# Patient Record
Sex: MALE | Race: WHITE
[De-identification: names, ages, dates, MRNs, and addresses within clinical notes are randomized per-mention and may not be internally consistent; named-entity substitution may affect disease eponyms.]

---

## 2018-08-19 ENCOUNTER — HOSPITAL ENCOUNTER (EMERGENCY)
Dept: HOSPITAL 62 - ER | Age: 23
Discharge: HOME | End: 2018-08-19
Payer: COMMERCIAL

## 2018-08-19 VITALS — DIASTOLIC BLOOD PRESSURE: 71 MMHG | SYSTOLIC BLOOD PRESSURE: 118 MMHG
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
## 2018-08-19 DIAGNOSIS — F17.200: ICD-10-CM
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
## 2018-08-19 DIAGNOSIS — S61.211A: Primary | ICD-10-CM

## 2018-08-19 PROCEDURE — 12001 RPR S/N/AX/GEN/TRNK 2.5CM/<: CPT

## 2018-08-19 PROCEDURE — 73140 X-RAY EXAM OF FINGER(S): CPT

## 2018-08-19 PROCEDURE — 99283 EMERGENCY DEPT VISIT LOW MDM: CPT

## 2018-08-19 PROCEDURE — 0HQGXZZ REPAIR LEFT HAND SKIN, EXTERNAL APPROACH: ICD-10-PCS | Performed by: EMERGENCY MEDICINE

## 2018-08-19 NOTE — ER DOCUMENT REPORT
ED Medical Screen (RME)





- General


Chief Complaint: Laceration


Stated Complaint: CUT FINGER


Time Seen by Provider: 08/19/18 19:42


Mode of Arrival: Ambulatory


Information source: Patient


Notes: 





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 

sushi.  Patient states that the bone was exposed.  His last tetanus shot he 

reports 2 years ago.  Currently hemostasis is maintained.  Patient is somewhat 

anxious and complaining of pain in triage.  Pain is addressed.  X-rays ordered.


TRAVEL OUTSIDE OF THE [place] IN LAST 30 DAYS: No





- Related Data


Allergies/Adverse Reactions: 


 





No Known Allergies Allergy (Verified 12/13/16 17:52)


 











Past Medical History





- Social History


Chew tobacco use (# tins/day): No


Frequency of alcohol use: None


Drug Abuse: Marijuana





- Past Medical History


Cardiac Medical History: 


   Denies: Hx Coronary Artery Disease, Hx Heart Attack, Hx Hypertension


Pulmonary Medical History: 


   Denies: Hx Asthma, Hx Bronchitis, Hx COPD, Hx Pneumonia


Neurological Medical History: Denies: Hx Cerebrovascular Accident, Hx Seizures


Renal/ Medical History: Denies: Hx Peritoneal Dialysis


Musculoskeltal Medical History: Denies Hx Arthritis


Past Surgical History: Denies: Hx Pacemaker





- Immunizations


Hx Diphtheria, Pertussis, Tetanus Vaccination: Yes





Physical Exam





- Vital signs


Vitals: 





 











Temp Pulse Resp BP Pulse Ox


 


 98.3 F   70   16   112/58 L  100 


 


 08/19/18 19:34  08/19/18 19:34  08/19/18 19:34  08/19/18 19:34  08/19/18 19:34














Course





- Vital Signs


Vital signs: 





 











Temp Pulse Resp BP Pulse Ox


 


 98.3 F   70   16   112/58 L  100 


 


 08/19/18 19:34  08/19/18 19:34  08/19/18 19:34  08/19/18 19:34  08/19/18 19:34

## 2018-08-19 NOTE — ER DOCUMENT REPORT
ED General





- General


Chief Complaint: Laceration


Stated Complaint: CUT FINGER


Time Seen by Provider: 08/19/18 19:42


Mode of Arrival: Ambulatory


Notes: 





Patient is a 23-year-old male who presents with a skin avulsion type laceration 

to the volar tip of the left index finger sustained with a knife while at work.

  Patient states that he is cutting through a plastic packing when he cut 

directly into his finger.  He describes an immediate, severe, burning pain to 

the affected area.  Nothing improves the pain.  Any movement or touching the 

area worsens the pain.  No history of similar injuries in the past.  He is right

-hand dominant.  He did not sustain any additional injuries today.  His tetanus 

is up-to-date.


TRAVEL OUTSIDE OF THE [place] IN LAST 30 DAYS: No





- Related Data


Allergies/Adverse Reactions: 


 





No Known Allergies Allergy (Verified 12/13/16 17:52)


 











Past Medical History





- General


Information source: Patient





- Social History


Smoking Status: Current Every Day Smoker


Chew tobacco use (# tins/day): No


Frequency of alcohol use: None


Drug Abuse: Marijuana


Lives with: Parents


Family History: Reviewed & Not Pertinent


Patient has suicidal ideation: No


Patient has homicidal ideation: No





- Past Medical History


Cardiac Medical History: 


   Denies: Hx Coronary Artery Disease, Hx Heart Attack, Hx Hypertension


Pulmonary Medical History: 


   Denies: Hx Asthma, Hx Bronchitis, Hx COPD, Hx Pneumonia


Neurological Medical History: Denies: Hx Cerebrovascular Accident, Hx Seizures


Renal/ Medical History: Denies: Hx Peritoneal Dialysis


Musculoskeletal Medical History: Denies Hx Arthritis


Past Surgical History: Denies: Hx Pacemaker





- Immunizations


Hx Diphtheria, Pertussis, Tetanus Vaccination: Yes





Review of Systems





- Review of Systems


Notes: 





Constitutional: Negative for fever.


Eyes: Negative for visual changes.


ENT: Negative for facial injury


Cardiovascular: Negative for chest injury.


Respiratory: Negative for shortness of breath.


Gastrointestinal: Negative for abdominal  injury.


Genitourinary: Negative for genital injury


Musculoskeletal: Negative for back injury. 


Skin: Positive for laceration/abrasions.


Neurological: Negative for head injury.





Physical Exam





- Vital signs


Vitals: 


 











Temp Pulse Resp BP Pulse Ox


 


 98.3 F   70   16   112/58 L  100 


 


 08/19/18 19:34  08/19/18 19:34  08/19/18 19:34  08/19/18 19:34  08/19/18 19:34











Interpretation: Normal


Notes: 





PHYSICAL EXAMINATION:





GENERAL: Appears uncomfortable but in no acute distress


HEAD: Atraumatic, normocephalic.





EYES:  sclera anicteric, conjunctiva are normal.





ENT: Moist mucous membranes.





NECK: Normal range of motion





LUNGS: Normal work of breathing





HEART: 2+ radial pulses bilaterally, capillary refill less than 1 second in the 

affected left index digit





EXTREMITIES: no pitting or edema.  No cyanosis.





NEUROLOGICAL: No focal neurological deficits. Moves all extremities 

spontaneously and on command.





PSYCH: Anxious





SKIN: Warm, Dry, normal turgor, flap type laceration to the volar pad of the 

left index finger





Course





- Re-evaluation


Re-evalutation: 





08/19/18 22:33


Patient presents with a almost near amputation of the volar tip of the left 

index finger without involvement of the nail.  The tissue was still adhered at 

the most distal edge of the skin avulsion and appeared to still be viable and 

well perfused.  The tissue was therefore anesthetized using a digital block.  

After anesthesia was achieved the patient did undergo a wound repair with 

placement of 3 Prolene stitches with appropriate approximation.  The wound was 

cleaned and irrigated prior to closure.  He has full flexion and extension at 

the DIP, PIP and MCP against resistance.  X-ray without any evidence of bony 

injury.  Tetanus is already up to date.  At this time will discharge with 

return precautions and follow-up recommendations.  Verbal discharge 

instructions given a the bedside and opportunity for questions given. 

Medication warnings reviewed. Patient is in agreement with this plan and has 

verbalized understanding of return precautions and the need for primary care 

follow-up in the next 24-72 hours.





- Vital Signs


Vital signs: 


 











Temp Pulse Resp BP Pulse Ox


 


 98.3 F   56 L  16   118/71   100 


 


 08/19/18 19:34  08/19/18 22:30  08/19/18 22:30  08/19/18 22:30  08/19/18 22:30














- Diagnostic Test


Radiology reviewed: Image reviewed, Reports reviewed


Radiology results interpreted by me: 





08/20/18 03:38


Left hand x-ray: No acute fractures or dislocations





Procedures





- Laceration/Wound Repair


  ** Left 2nd digit


Wound length (cm): 1


Wound's Depth, Shape: Irregular, Flap, Contused tissue


Laceration pre-procedure: Sterile PPE donned


Anesthetic type: 1% Lidocaine


Volume Anesthetic (mLs): 3 - Digital block


Wound explored: No foreign body removed


Irrigated w/ Saline (mLs): 500


Wound Debrided: Moderate


Wound Repaired With: Sutures


Suture Size/Type: 5:0, Prolene


Number of Sutures: 3


Post-procedure wound care: Sterile dressing applied


Post-procedure NV exam normal: Yes


Complications: No





Discharge





- Discharge


Clinical Impression: 


Laceration of left index finger


Qualifiers:


 Encounter type: initial encounter Damage to nail status: without damage 

Foreign body presence: without foreign body Qualified Code(s): S61.211A - 

Laceration without foreign body of left index finger without damage to nail, 

initial encounter





Condition: Good


Disposition: HOME, SELF-CARE


Additional Instructions: 


Please return to your primary doctor, the ED, or an urgent care in 7 days for 

suture removal. Return immediately if you develop spreading redness around the 

wound, pus from the wound, worsening pain, or a fever of >100.4. Keep the area 

clean and dry. Wash gently with soap and water twice daily and cover with 

antibiotic ointment.





For your pain: Take ibuprofen 600 mg and acetaminophen 1000 mg every 6 hours 

together as needed for pain.

## 2018-08-19 NOTE — RADIOLOGY REPORT (SQ)
EXAM DESCRIPTION:  FINGER LEFT



COMPLETED DATE/TIME:  8/19/2018 8:12 pm



REASON FOR STUDY:  Cut L index fingertip with a knife



COMPARISON:  None.



NUMBER OF VIEWS:  Three views.



TECHNIQUE:  AP, lateral, and oblique images acquired of the left choose tech



LIMITATIONS:  None.



FINDINGS:  MINERALIZATION: Normal.

BONES: No acute fracture or dislocation.  No worrisome bone lesions.

SOFT TISSUES: Soft tissue laceration of a distal pad

OTHER: No foreign body.



IMPRESSION:  No acute bony changes.



COMMENT:   SITE OF TRAUMA/COMPLAINT MARKED/STAMP COMPLETED: Yes



TECHNICAL DOCUMENTATION:  JOB ID:  [id number]

 2011 [employer]- All Rights Reserved



Reading location - IP/workstation name: [email or web address]